# Patient Record
Sex: MALE | Race: WHITE
[De-identification: names, ages, dates, MRNs, and addresses within clinical notes are randomized per-mention and may not be internally consistent; named-entity substitution may affect disease eponyms.]

---

## 2020-02-26 ENCOUNTER — HOSPITAL ENCOUNTER (INPATIENT)
Dept: HOSPITAL 96 - M.ERS | Age: 85
LOS: 2 days | Discharge: HOME | DRG: 242 | End: 2020-02-28
Attending: FAMILY MEDICINE | Admitting: FAMILY MEDICINE
Payer: COMMERCIAL

## 2020-02-26 VITALS — SYSTOLIC BLOOD PRESSURE: 135 MMHG | DIASTOLIC BLOOD PRESSURE: 81 MMHG

## 2020-02-26 VITALS
WEIGHT: 158 LBS | HEIGHT: 66 IN | BODY MASS INDEX: 25.39 KG/M2 | DIASTOLIC BLOOD PRESSURE: 102 MMHG | SYSTOLIC BLOOD PRESSURE: 129 MMHG

## 2020-02-26 VITALS — SYSTOLIC BLOOD PRESSURE: 124 MMHG | DIASTOLIC BLOOD PRESSURE: 80 MMHG

## 2020-02-26 VITALS — SYSTOLIC BLOOD PRESSURE: 147 MMHG | DIASTOLIC BLOOD PRESSURE: 76 MMHG

## 2020-02-26 VITALS — DIASTOLIC BLOOD PRESSURE: 85 MMHG | SYSTOLIC BLOOD PRESSURE: 141 MMHG

## 2020-02-26 VITALS — DIASTOLIC BLOOD PRESSURE: 84 MMHG | SYSTOLIC BLOOD PRESSURE: 127 MMHG

## 2020-02-26 VITALS — SYSTOLIC BLOOD PRESSURE: 137 MMHG | DIASTOLIC BLOOD PRESSURE: 84 MMHG

## 2020-02-26 VITALS — DIASTOLIC BLOOD PRESSURE: 79 MMHG | SYSTOLIC BLOOD PRESSURE: 125 MMHG

## 2020-02-26 VITALS — DIASTOLIC BLOOD PRESSURE: 85 MMHG | SYSTOLIC BLOOD PRESSURE: 144 MMHG

## 2020-02-26 DIAGNOSIS — Z79.899: ICD-10-CM

## 2020-02-26 DIAGNOSIS — N18.3: ICD-10-CM

## 2020-02-26 DIAGNOSIS — I95.9: ICD-10-CM

## 2020-02-26 DIAGNOSIS — Z90.49: ICD-10-CM

## 2020-02-26 DIAGNOSIS — R79.89: ICD-10-CM

## 2020-02-26 DIAGNOSIS — K75.89: ICD-10-CM

## 2020-02-26 DIAGNOSIS — D68.59: ICD-10-CM

## 2020-02-26 DIAGNOSIS — C61: ICD-10-CM

## 2020-02-26 DIAGNOSIS — I44.2: Primary | ICD-10-CM

## 2020-02-26 DIAGNOSIS — Z79.01: ICD-10-CM

## 2020-02-26 DIAGNOSIS — I48.20: ICD-10-CM

## 2020-02-26 DIAGNOSIS — Z86.010: ICD-10-CM

## 2020-02-26 DIAGNOSIS — Z95.5: ICD-10-CM

## 2020-02-26 DIAGNOSIS — Z79.82: ICD-10-CM

## 2020-02-26 DIAGNOSIS — I31.3: ICD-10-CM

## 2020-02-26 DIAGNOSIS — N17.0: ICD-10-CM

## 2020-02-26 DIAGNOSIS — Z91.81: ICD-10-CM

## 2020-02-26 DIAGNOSIS — I25.10: ICD-10-CM

## 2020-02-26 LAB
ABSOLUTE MONOCYTES: 0.1 THOU/UL (ref 0–1.2)
ALBUMIN SERPL-MCNC: 3.4 G/DL (ref 3.4–5)
ALP SERPL-CCNC: 190 U/L (ref 46–116)
ALT SERPL-CCNC: 138 U/L (ref 30–65)
ANION GAP SERPL CALC-SCNC: 13 MMOL/L (ref 7–16)
ANISOCYTOSIS BLD QL SMEAR: (no result)
APTT BLD: 28.1 SECONDS (ref 25–31.3)
AST SERPL-CCNC: 82 U/L (ref 15–37)
BILIRUB SERPL-MCNC: 0.8 MG/DL
BILIRUB UR-MCNC: NEGATIVE MG/DL
BUN SERPL-MCNC: 72 MG/DL (ref 7–18)
CALCIUM SERPL-MCNC: 8.8 MG/DL (ref 8.5–10.1)
CHLORIDE SERPL-SCNC: 99 MMOL/L (ref 98–107)
CO2 SERPL-SCNC: 24 MMOL/L (ref 21–32)
COLOR UR: YELLOW
CREAT SERPL-MCNC: 2.2 MG/DL (ref 0.6–1.3)
GLUCOSE SERPL-MCNC: 111 MG/DL (ref 70–99)
GRANULOCYTES NFR BLD MANUAL: 84 %
HCT VFR BLD CALC: 33.5 % (ref 42–52)
HGB BLD-MCNC: 11.4 GM/DL (ref 14–18)
INR PPP: 1.7
KETONES UR STRIP-MCNC: NEGATIVE MG/DL
LYMPHOCYTES # BLD: 0.7 THOU/UL (ref 0.8–5.3)
LYMPHOCYTES NFR BLD AUTO: 11 %
MCH RBC QN AUTO: 32.8 PG (ref 26–34)
MCHC RBC AUTO-ENTMCNC: 33.9 G/DL (ref 28–37)
MCV RBC: 96.9 FL (ref 80–100)
MONOCYTES NFR BLD: 2 %
MPV: 10.5 FL. (ref 7.2–11.1)
NEUTROPHILS # BLD: 5.9 THOU/UL (ref 1.6–8.1)
NEUTS BAND NFR BLD: 3 %
NUCLEATED RBCS: 0 /100WBC
PLATELET # BLD EST: (no result) 10*3/UL
PLATELET COUNT*: 118 THOU/UL (ref 150–400)
POTASSIUM SERPL-SCNC: 5.1 MMOL/L (ref 3.5–5.1)
PROT SERPL-MCNC: 8.2 G/DL (ref 6.4–8.2)
PROT UR QL STRIP: NEGATIVE
PROTHROMBIN TIME: 16.7 SECONDS (ref 9.2–11.5)
RBC # BLD AUTO: 3.46 MIL/UL (ref 4.5–6)
RBC # UR STRIP: NEGATIVE /UL
RDW-CV: 16 % (ref 10.5–14.5)
SODIUM SERPL-SCNC: 136 MMOL/L (ref 136–145)
SP GR UR STRIP: 1.01 (ref 1–1.03)
URINE CLARITY: CLEAR
URINE GLUCOSE-RANDOM: NEGATIVE
URINE LEUKOCYTES-REFLEX: NEGATIVE
URINE NITRITE-REFLEX: NEGATIVE
UROBILINOGEN UR STRIP-ACNC: 0.2 E.U./DL (ref 0.2–1)
WBC # BLD AUTO: 6.8 THOU/UL (ref 4–11)

## 2020-02-27 VITALS — DIASTOLIC BLOOD PRESSURE: 75 MMHG | SYSTOLIC BLOOD PRESSURE: 117 MMHG

## 2020-02-27 VITALS — SYSTOLIC BLOOD PRESSURE: 89 MMHG | DIASTOLIC BLOOD PRESSURE: 65 MMHG

## 2020-02-27 VITALS — SYSTOLIC BLOOD PRESSURE: 102 MMHG | DIASTOLIC BLOOD PRESSURE: 63 MMHG

## 2020-02-27 VITALS — SYSTOLIC BLOOD PRESSURE: 101 MMHG | DIASTOLIC BLOOD PRESSURE: 60 MMHG

## 2020-02-27 VITALS — DIASTOLIC BLOOD PRESSURE: 73 MMHG | SYSTOLIC BLOOD PRESSURE: 112 MMHG

## 2020-02-27 VITALS — SYSTOLIC BLOOD PRESSURE: 105 MMHG | DIASTOLIC BLOOD PRESSURE: 62 MMHG

## 2020-02-27 VITALS — SYSTOLIC BLOOD PRESSURE: 99 MMHG | DIASTOLIC BLOOD PRESSURE: 59 MMHG

## 2020-02-27 VITALS — SYSTOLIC BLOOD PRESSURE: 105 MMHG | DIASTOLIC BLOOD PRESSURE: 60 MMHG

## 2020-02-27 VITALS — SYSTOLIC BLOOD PRESSURE: 123 MMHG | DIASTOLIC BLOOD PRESSURE: 77 MMHG

## 2020-02-27 VITALS — SYSTOLIC BLOOD PRESSURE: 121 MMHG | DIASTOLIC BLOOD PRESSURE: 69 MMHG

## 2020-02-27 VITALS — SYSTOLIC BLOOD PRESSURE: 111 MMHG | DIASTOLIC BLOOD PRESSURE: 65 MMHG

## 2020-02-27 VITALS — SYSTOLIC BLOOD PRESSURE: 109 MMHG | DIASTOLIC BLOOD PRESSURE: 59 MMHG

## 2020-02-27 VITALS — DIASTOLIC BLOOD PRESSURE: 63 MMHG | SYSTOLIC BLOOD PRESSURE: 111 MMHG

## 2020-02-27 VITALS — SYSTOLIC BLOOD PRESSURE: 112 MMHG | DIASTOLIC BLOOD PRESSURE: 67 MMHG

## 2020-02-27 LAB
APTT BLD: 29.1 SECONDS (ref 25–31.3)
INR PPP: 1.6
PROTHROMBIN TIME: 16.2 SECONDS (ref 9.2–11.5)

## 2020-02-27 PROCEDURE — 02HK3JZ INSERTION OF PACEMAKER LEAD INTO RIGHT VENTRICLE, PERCUTANEOUS APPROACH: ICD-10-PCS | Performed by: INTERNAL MEDICINE

## 2020-02-27 PROCEDURE — B517YZZ FLUOROSCOPY OF LEFT SUBCLAVIAN VEIN USING OTHER CONTRAST: ICD-10-PCS | Performed by: INTERNAL MEDICINE

## 2020-02-27 PROCEDURE — 02H63JZ INSERTION OF PACEMAKER LEAD INTO RIGHT ATRIUM, PERCUTANEOUS APPROACH: ICD-10-PCS | Performed by: INTERNAL MEDICINE

## 2020-02-27 PROCEDURE — 0JH604Z INSERTION OF PACEMAKER, SINGLE CHAMBER INTO CHEST SUBCUTANEOUS TISSUE AND FASCIA, OPEN APPROACH: ICD-10-PCS | Performed by: INTERNAL MEDICINE

## 2020-02-27 NOTE — CARD
[address]  [address]                    CARDIAC CATH REPORT           
_______________________________________________________________________________
 
Name:       HUDSON URENA                 Room:           [address] [place]  
.R.#:  [id number]      Account #:      [id number]  
Admission:  02/26/20     Attend Phys:    Dejuan Valdez MD, F
Discharge:               Date of Birth:  [birth year]  
         Report #: [id number]
                                                                     [id number]
_______________________________________________________________________________
THIS REPORT FOR:  //name//                      
 
cc:  Terrie Douglass NP, Stefany NP                                                  ~
THIS REPORT FOR:  //name//                      
 
 
--------------- APPROVED REPORT --------------
 
 
Study performed:  02/26/2020 16:35:44
 
Patient Status: ED       Room #: 
Event Personnel: Dejuan Valdez Cardiologist, Jeanette Mendieta RN RN, 
Kentrell Luong RTR Scrub, Breana Radford RTR Monitor
Exam: Temperary Pacemaker Insertion
Indications: Complete Heart Block
 
The patient is a 88 year-old male with a history of Syncope.
 
Patient Info
Anticoagulant Therapy: eliquis
 
Conscious Sedation
No sedation was given. Case start time was 17:29 and case end time 
was 17:36.   
 
Implanted Devices:  temporary pacemaker lead
 
Procedure
The patient underwent informed consent. We discussed the details of 
the procedure including the risks, which include, but not limited to 
bleeding, infection, vascular damage, cardiac perforation, and 
pneumothorax. He understood these risks and was willing to proceed. 
As such,  was brought to the EP/Cardiac Catheterization laboratory in 
a fasting and sedated state and prepped and draped in a
The patient was  prepped and draped in a sterile fashion. The right 
leg was infiltrated with 2% lidocaine. The right femoral vein was 
accessed. A 6 Fr sheath was inserted and a temperary pacing lead was 
inserted into the left ventricle. The temperary pacemaker was tested 
for capture. Capture threshold was noted. The sheath and temperary 
pacing lead was secured the right leg using 0 silk suture. A sterile 
dressing was place over site for protection.
 
Electrode Parameters
 Ventricular Threshold:  less than 1 mA 
 
 
 
[place] [address]                    CARDIAC CATH REPORT           
_______________________________________________________________________________
 
Name:       HUDSON URENA                 Room:           [address] [place]  
[facility]  [id number]      Account #:      [id number]  
Admission:  02/26/20     Attend Phys:    Dejuan Valdez MD, F
Discharge:               Date of Birth:  [birth year]  
         Report #: [id number]
                                                                     [id number]
_______________________________________________________________________________
 
Complications
The patient tolerated the procedure well and there were no 
complications associated with the procedure. 
 
Findings
Specimens Removed:  No   
Estimated Blood Loss:  2 ml
 
Conclusion
successful placement of a temporary pacemaker lead
 
 
 
 
 
 
 
 
 
 
 
 
 
 
 
 
 
 
 
 
 
 
 
 
 
 
 
 
 
 
 
 
 
<ELECTRONICALLY SIGNED>
                                        By:  Dejuan Valdez MD, [facility]      
02/27/20     0850
D: 02/27/20 0850_______________________________________
T: 02/27/20 0862Daclaus Valdez MD, FACC         /INF

## 2020-02-27 NOTE — CON
[address]  [address]                    CONSULTATION                  
_______________________________________________________________________________
 
Name:       HUDSON URENA                 Room:           [address]    ADM [place]  
M.R.#:  [id number]      Account #:      [id number]  
Admission:  02/26/20     Attend Phys:    Dejuan Valdez MD, F
Discharge:               Date of Birth:  [birth year]  
         Report #: [id number]
                                                                     [id number]  
_______________________________________________________________________________
THIS REPORT FOR:  //name//                      
 
cc:  Terrie Douglass NP, Stefany NP                                                  ~
THIS REPORT FOR:  //name//                      
 
CC: Dejuan Douglass
 
DATE OF SERVICE:  02/26/2020
 
 
CARDIOLOGY CONSULTATION
 
HISTORY OF PRESENT ILLNESS:  The patient is an 88-year-old  white male
who I was asked to see in the Emergency Room today after he had evidence of
complete heart block.  Unfortunately, no old records are available.  The patient
has never been here to [place] before.  There are no family members
available.  According to the patient, he has had a couple of coronary stents
placed in the past.  He denies any recent chest pain.  Recently, he has been
falling.  He apparently had a syncopal spell.  Today, his family called the
ambulance.  He was found to be in complete heart block.  He was paced externally
and was brought to the Emergency Room.  I was asked to see him on an emergent
basis.  He denies any recent palpitations.  He has a long history of shortness
of breath.  No significant edema.
 
PAST MEDICAL HISTORY:  He has had appendectomy, removal of a hemangioma.
 
MEDICATIONS:  Include aspirin, Eliquis, Lasix.
 
ALLERGIES:  He has no known drug allergies.
 
FAMILY HISTORY:  Negative for heart disease.
 
SOCIAL HISTORY:  He is .  He and his wife live in [place].  No
smoking.
 
REVIEW OF SYSTEMS:  He has no history of stroke, asthma, peptic ulcer disease,
liver disease, cancer, psychiatric illness, chronic skin condition.
 
PHYSICAL EXAMINATION:
GENERAL:  Revealed an elderly, frail-appearing male, appeared in no distress.
VITAL SIGNS:  He had a blood pressure of 100.  Pulse is being externally paced
at 80.
HEENT:  He was anicteric.  Conjunctivae are pink.  Mucous membranes were moist.
CHEST:  Clear to auscultation.
 
 
 
[place] [address]                    CONSULTATION                  
_______________________________________________________________________________
 
Name:       HUDSON URENA                 Room:           [address]#:  [id number]      Account #:      [id number]  
Admission:  02/26/20     Attend Phys:    Dejuan Valdez MD, F
Discharge:               Date of Birth:  [birth year]  
         Report #: [id number]
                                                                     [id number]  
_______________________________________________________________________________
CARDIOVASCULAR:  Regular rate and rhythm after being paced.
ABDOMEN:  Soft.
EXTREMITIES:  Had no edema.  Dorsalis pedis pulse cannot be palpated.
 
IMPRESSION AND RECOMMENDATIONS:
1.  Complete heart block.  Recommend temporary pacemaker.
2.  Use of Eliquis.  Possible previous history of atrial fibrillation.  We will
obtain old records.
3.  Coronary artery disease and previous stenting.  No recent angina.
4.  Chronic kidney disease.
 
 
 
 
 
 
 
 
 
 
 
 
 
 
 
 
 
 
 
 
 
 
 
 
 
 
 
 
 
 
 
 
 
 
<ELECTRONICALLY SIGNED>
                                        By:  Dejuan Valdez MD, FACC      
02/27/20     0856
D: 02/26/20 1642_______________________________________
T: 02/27/20 0317David SWETHA Valdez MD, [facility]         /nt

## 2020-02-27 NOTE — 2DMMODE
[place] [address]
Phone:  [phone] 2 D/M-MODE ECHOCARDIOGRAM     
_______________________________________________________________________________
 
Name:         HUDSON URENA JEISON                Room:          002P    [facility] [place] 
.R.#:    [id number]     Account #:     [id number]  
Admission:    20    Attend Phys:   Tacho ortega Sa
Discharge:                Date of Birth: [birth year]  
Date of Service: 20 1248  Report #:      [id number]
        [id number]
_______________________________________________________________________________
THIS REPORT FOR:
 
cc:  Terrie Douglass NP, Stefany NP Blick, David R. MD [facility]        
                                                                       ~
 
--------------- APPROVED REPORT --------------
 
 
Study performed:  2020 10:51:59
 
EXAM: Comprehensive 2D, Doppler, and color-flow 
Echocardiogram 
Patient Location: In-Patient   
Room #:  002     Status:  routine
 
      BSA:         1.86
HR: 70 bpm BP:          105/60 mmHg 
Rhythm: NSR     
 
Other Information 
Study Quality: Excellent
 
Indications
Abnormal ECG 
Syncope 
 
2D Dimensions
IVSd:  20.80 (7-11mm) LVOT Diam:  19.14 (18-24mm) 
LVDd:  44.70 mm  
PWd:  12.28 (7-11mm) Ascending Ao:  35.86 (22-36mm)
LVDs:  39.87 (25-40mm) 
Aortic Root:  36.73 mm 
 
Volumes
Left Atrial Volume (Systole) 
    LA ESV Index:  55.70 mL/m2
 
Aortic Valve
AoV Peak Shlomo.:  1.14 m/s 
AO Peak Gr.:  5.23 mmHg  LVOT Max P.41 mmHg
AO Mean Gr.:  2.61 mmHg  LVOT Mean P.12 mmHg
    LVOT Max V:  0.78 m/s
AO V2 VTI:  17.20 cm  LVOT Mean V:  0.48 m/s
GINGER (VTI):  2.53 cm2  LVOT V1 VTI:  15.11 cm
 
 
[place] [address]
Phone:  [phone]                     2 D/M-MODE ECHOCARDIOGRAM     
_______________________________________________________________________________
 
Name:         HUDSON URENA                Room:          [address] [place] 
.R.#:    [id number]     Account #:     [id number]  
Admission:    20    Attend Phys:   Tacho ortega Sa
Discharge:                Date of Birth: [birth year]  
Date of Service: 20 1248  Report #:      [id number]
        [id number]
_______________________________________________________________________________
AI Kendall:  1.50 m/s2  
AI PHT:  625.93 ms  
 
Mitral Valve
    E/A Ratio:  2.04
    MV Decel. Time:  213.41 ms
MV E Max Shlomo.:  0.65 m/s 
MV PHT:  61.89 ms  
MVA (PHT):  3.55 cm2  
 
TDI
E/Lateral E':  10.83 E/Medial E':  16.25
   Medial E' Shlomo.:  0.04 m/s
   Lateral E' Shlomo.:  0.06 m/s
 
Pulmonary Valve
PV Peak Shlomo.:  0.77 m/s PV Peak Gr.:  2.39 mmHg
 
Tricuspid Valve
    RAP Estimate:  5.00 mmHg
TR Peak Gr.:  43.88 mmHg RVSP:  48.00 mmHg
    PA Pressure:  48.00 mmHg
 
Left Ventricle
The left ventricle is normal size. paradoxical septal motion 
consistent with a paced rhythm moderate concentric left ventricular 
hypertrophy. Left ventricular systolic function is mildly decreased. 
LVEF is 40-45%. Grade IV - fixed restrictive diastolic 
dysfunction.
 
Right Ventricle
Right ventricle is dilated. The right ventricular systolic function 
is normal. Pacemaker lead is present in the right ventricle. 
 
Atria
Left atrium is severely dilated. Right atrium is dilated.
 
Aortic Valve
Mild aortic valve sclerosis. Mild aortic regurgitation. There is no 
aortic valvular stenosis.
 
Mitral Valve
There is mitral annular calcification. Moderate mitral regurgitation. 
No evidence of mitral valve stenosis.
 
Tricuspid Valve
 
 
[place] [address]
Phone:  [phone]                     2 D/M-MODE ECHOCARDIOGRAM     
_______________________________________________________________________________
 
Name:         HUDSON URENA                Room:          [address] [place] 
[facility]#:    [id number]     Account #:     [id number]  
Admission:    20    Attend Phys:   Tacho ortega Sa
Discharge:                Date of Birth: [birth year]  
Date of Service: 20 1248  Report #:      [id number]
        [id number]
_______________________________________________________________________________
The tricuspid valve is normal in structure. Mild tricuspid 
regurgitation. estimated pa pressure 40 mm Hg
 
Pulmonic Valve
The pulmonary valve is normal in structure. Mild pulmonic 
regurgitation.
 
Great Vessels
The aortic root is normal in size. IVC is normal in size and 
collapses >50% with inspiration.
 
Pericardium
Moderate circumferential pericardial 
effusion.
 
<Conclusion>
moderate concentric left ventricular hypertrophy.
LVEF is 40-45%.
Left atrium is severely dilated.
Right atrium is dilated.
Mild aortic valve sclerosis.
Mild aortic regurgitation.
Moderate mitral regurgitation.
Mild tricuspid regurgitation.
estimated pa pressure 40 mm Hg
Moderate circumferential pericardial effusion.
 
 
 
 
 
 
 
 
 
 
 
 
 
 
 
 
 
 
  <ELECTRONICALLY SIGNED>
                                           By: Dejuan Valdez MD, FACC      
  20     1248
D: 20 1248   _____________________________________
T: 20 1248   Dejuan Valdez MD, FACC        /INF

## 2020-02-28 VITALS — SYSTOLIC BLOOD PRESSURE: 110 MMHG | DIASTOLIC BLOOD PRESSURE: 53 MMHG

## 2020-02-28 VITALS — DIASTOLIC BLOOD PRESSURE: 65 MMHG | SYSTOLIC BLOOD PRESSURE: 125 MMHG

## 2020-02-28 VITALS — SYSTOLIC BLOOD PRESSURE: 110 MMHG | DIASTOLIC BLOOD PRESSURE: 55 MMHG

## 2020-02-28 VITALS — DIASTOLIC BLOOD PRESSURE: 55 MMHG | SYSTOLIC BLOOD PRESSURE: 110 MMHG

## 2020-02-28 VITALS — DIASTOLIC BLOOD PRESSURE: 64 MMHG | SYSTOLIC BLOOD PRESSURE: 126 MMHG

## 2020-02-28 VITALS — SYSTOLIC BLOOD PRESSURE: 120 MMHG | DIASTOLIC BLOOD PRESSURE: 54 MMHG

## 2020-02-28 LAB
ALBUMIN SERPL-MCNC: 2.7 G/DL (ref 3.4–5)
ALP SERPL-CCNC: 157 U/L (ref 46–116)
ALT SERPL-CCNC: 75 U/L (ref 30–65)
ANION GAP SERPL CALC-SCNC: 11 MMOL/L (ref 7–16)
ANION GAP SERPL CALC-SCNC: 11 MMOL/L (ref 7–16)
AST SERPL-CCNC: 36 U/L (ref 15–37)
BILIRUB SERPL-MCNC: 0.9 MG/DL
BUN SERPL-MCNC: 60 MG/DL (ref 7–18)
BUN SERPL-MCNC: 60 MG/DL (ref 7–18)
CALCIUM SERPL-MCNC: 8.3 MG/DL (ref 8.5–10.1)
CALCIUM SERPL-MCNC: 8.3 MG/DL (ref 8.5–10.1)
CHLORIDE SERPL-SCNC: 104 MMOL/L (ref 98–107)
CHLORIDE SERPL-SCNC: 104 MMOL/L (ref 98–107)
CO2 SERPL-SCNC: 24 MMOL/L (ref 21–32)
CO2 SERPL-SCNC: 24 MMOL/L (ref 21–32)
CREAT SERPL-MCNC: 1.8 MG/DL (ref 0.6–1.3)
CREAT SERPL-MCNC: 1.9 MG/DL (ref 0.6–1.3)
GLUCOSE SERPL-MCNC: 126 MG/DL (ref 70–99)
GLUCOSE SERPL-MCNC: 126 MG/DL (ref 70–99)
HCT VFR BLD CALC: 31.4 % (ref 42–52)
HGB BLD-MCNC: 10.7 GM/DL (ref 14–18)
MAGNESIUM SERPL-MCNC: 2.2 MG/DL (ref 1.8–2.4)
MCH RBC QN AUTO: 33 PG (ref 26–34)
MCHC RBC AUTO-ENTMCNC: 34.2 G/DL (ref 28–37)
MCV RBC: 96.6 FL (ref 80–100)
MPV: 9.6 FL. (ref 7.2–11.1)
PLATELET COUNT*: 115 THOU/UL (ref 150–400)
POTASSIUM SERPL-SCNC: 4.6 MMOL/L (ref 3.5–5.1)
POTASSIUM SERPL-SCNC: 4.7 MMOL/L (ref 3.5–5.1)
PROT SERPL-MCNC: 7 G/DL (ref 6.4–8.2)
RBC # BLD AUTO: 3.25 MIL/UL (ref 4.5–6)
RDW-CV: 16.3 % (ref 10.5–14.5)
SODIUM SERPL-SCNC: 139 MMOL/L (ref 136–145)
SODIUM SERPL-SCNC: 139 MMOL/L (ref 136–145)
WBC # BLD AUTO: 6.9 THOU/UL (ref 4–11)

## 2020-02-28 NOTE — CARD
[address]  [address]                    CARDIAC CATH REPORT           
_______________________________________________________________________________
 
Name:       AYANNAHUDSON JEISON                 Room:           [address] [place]  
.R.#:  [id number]      Account #:      [id number]  
Admission:  02/26/20     Attend Phys:    Tacho ortega [place]
Discharge:               Date of Birth:  [birth year]  
         Report #: [id number]
                                                                     [id number]
_______________________________________________________________________________
THIS REPORT FOR:  //name//                      
 
cc:  Terrie Douglass NP, Stefany NP                                                  ~
THIS REPORT FOR:  //name//                      
 
 
--------------- APPROVED REPORT --------------
 
 
Study performed:  02/27/2020 15:07:22
 
Patient Status: In-Patient       Room #: 
Exam: Insertion of Single Chamber Permanent Pacemaker
Indications: Sick Sinus Syndrome/Tachy Sanket Syndrome
 
The patient is a 88 year-old male with a history of Syncope.
 
Patient Info
Anticoagulant Therapy: eliquis
 
Implanted Devices:  permanent single lead MRI compatible [employer] 
pacemaker
 
Procedure
The patient underwent informed consent. We discussed the details of 
the procedure including the risks, which include, but not limited to 
bleeding, infection, vascular damage, cardiac perforation, and 
pneumothorax. He understood these risks and was willing to proceed. 
As such, he was brought to the EP/Cardiac Catheterization laboratory 
in a fasting and sedated state and prepped and draped in a sterile 
fashion, received IV antibiotics prior to initiation of the procedure 
and a venogram was performed showing patency of the left axillary 
vein.
The patient underwent conscious sedation, with no related 
complications.
The patient was brought to the EP/Cardiac Catheterization laboratory 
and the left chest and shoulder were prepped and draped in a sterile 
manner.
During this case, Fluoroscopy and visipaque 10cc were used for 
imaging.
The left subclavian region was infiltrated with 2% Lidocaine 
subcutaneous anesthesia. A transverse incision was made in the left 
upper chest cavity.
The subcutaneous pocket was formed via blunt dissection. Percutaneous 
venous access was achieved and an introducer sheath was inserted into 
 
 
 
[place] [address]                    CARDIAC CATH REPORT           
_______________________________________________________________________________
 
Name:       HUDSON URENA                 Room:           [address] [place]  
.R.#:  [id number]      Account #:      [id number]  
Admission:  02/26/20     Attend Phys:    Tacho Cooley
Discharge:               Date of Birth:  [birth year]  
         Report #: [id number]
                                                                     [id number]
_______________________________________________________________________________
the left Subclavian vein. 
Sheaths were positions using the modified Seldinger technique
Through the introducer sheaths the ventricular lead wire was 
positioned in the right atrial appendage and right ventricular apex 
respectively.
Utilizing fluoroscopic guidance, the ventricular lead wire was 
advanced over the wires and positioned in the right atria and right 
ventricle respectively. 
Capturing and sensing thresholds were verified. 
 
Electrode Parameters
 R Wave: 4.0 mv    
 Ventricular Threshold:  0.9 v @ .4ms 
 Ventricular Resistance:  489 ohm 
 
Single Chamber
The ventricular lead was attached to the appropriate receptacle on 
the pulse generator and set screws firmly tightened to insure 
adequate contact and stability. 
 
The lead and pulse generator were placed into the subcutaneous 
pocket. Sharp and sponge counts were confirmed to be correct. At this 
time the pocket was closed subcutaneously with a 0 Vicryl and the 
skin was closed with a 4.0 Vicryl.  The operative site was dressed in 
sterile fashion with skin affix and the patient was transferred to 
the floor in stable condition.
 
Complications
The patient tolerated the procedure well and there were no 
complications associated with the procedure. 
Since the patient had been on Eliquis, the pocket was irrigated with 
d-stat flowable solution.
 
Findings
Specimens Removed:  No   
Estimated Blood Loss:  5 cc
 
Conclusion
successful placement of a single lead pacemaker
 
 
 
 
 
<ELECTRONICALLY SIGNED>
                                        By:  Dejuan Valdez MD, FACC      
02/28/20 0938
D: 02/28/20 0938_______________________________________
T: 02/28/20 0938Dejuan Valdez MD, FACC         /INF

## 2020-02-28 NOTE — EKG
[place] [address]
Phone:  [phone]                     ELECTROCARDIOGRAM REPORT      
_______________________________________________________________________________
 
Name:         HUSDON URENA                Room:          [address]    ADM IN 
.R.#:    [id number]     Account #:     [id number]  
Admission:    20    Attend Phys:   Tacho ortega Sa
Discharge:                Date of Birth: [birth year]  
Date of Service: 20 0348  Report #:      [id number]
        [id number]
_______________________________________________________________________________
THIS REPORT FOR:  //name//                      
 
                          [facility]
                                       
Test Date:    2020               Test Time:    03:48:56
Pat Name:     HUDSON URENA             Department:   
Patient ID:   [id number]            Room:         [facility]
Gender:       M                        Technician:   RICKIE
:          [id number]               Requested By: Dejuan Valdez
Order Number: [id number]    Griffin MD:   Dejuan Valdez
                                 Measurements
Intervals                              Axis          
Rate:         60                       P:            0
DE:           200                      QRS:          -85
QRSD:         145                      T:            107
QT:           515                                    
QTc:          515                                    
                           Interpretive Statements
Ventricular-paced complexes
No further analysis attempted due to paced rhythm
No previous ECG available for comparison
 
Electronically Signed On 2020 9:44:12 CST by Dejuan Valdez
[email or web address]
 
 
 
 
 
 
 
 
 
 
 
 
 
 
 
 
 
 
 
 
  <ELECTRONICALLY SIGNED>
                                           By: Dejuan Valdez MD, [facility]      
  20     0944
D: 20 0348   _____________________________________
T: 20 0348   Dejuan Valdez MD, [facility]        /EPI

## 2020-11-03 ENCOUNTER — HOSPITAL ENCOUNTER (OUTPATIENT)
Dept: HOSPITAL 96 - M.LAB | Age: 85
End: 2020-11-03
Attending: NURSE PRACTITIONER
Payer: COMMERCIAL

## 2020-11-03 DIAGNOSIS — N18.9: Primary | ICD-10-CM

## 2020-11-03 LAB
ANION GAP SERPL CALC-SCNC: 9 MMOL/L (ref 7–16)
BUN SERPL-MCNC: 36 MG/DL (ref 7–18)
CALCIUM SERPL-MCNC: 9.3 MG/DL (ref 8.5–10.1)
CHLORIDE SERPL-SCNC: 103 MMOL/L (ref 98–107)
CO2 SERPL-SCNC: 26 MMOL/L (ref 21–32)
CREAT SERPL-MCNC: 1.6 MG/DL (ref 0.6–1.3)
GLUCOSE SERPL-MCNC: 123 MG/DL (ref 70–99)
POTASSIUM SERPL-SCNC: 4.1 MMOL/L (ref 3.5–5.1)
SODIUM SERPL-SCNC: 138 MMOL/L (ref 136–145)